# Patient Record
Sex: MALE | Race: WHITE | URBAN - NONMETROPOLITAN AREA
[De-identification: names, ages, dates, MRNs, and addresses within clinical notes are randomized per-mention and may not be internally consistent; named-entity substitution may affect disease eponyms.]

---

## 2023-11-02 PROBLEM — S83.232A COMPLEX TEAR OF MEDIAL MENISCUS OF LEFT KNEE AS CURRENT INJURY: Status: ACTIVE | Noted: 2023-11-02

## 2024-01-20 ENCOUNTER — OFFICE VISIT (OUTPATIENT)
Dept: URGENT CARE | Facility: PHYSICIAN GROUP | Age: 44
End: 2024-01-20
Payer: OTHER GOVERNMENT

## 2024-01-20 VITALS
HEART RATE: 99 BPM | DIASTOLIC BLOOD PRESSURE: 80 MMHG | BODY MASS INDEX: 30.27 KG/M2 | OXYGEN SATURATION: 95 % | TEMPERATURE: 96.8 F | SYSTOLIC BLOOD PRESSURE: 108 MMHG | WEIGHT: 217 LBS | RESPIRATION RATE: 16 BRPM

## 2024-01-20 DIAGNOSIS — U07.1 COVID-19: ICD-10-CM

## 2024-01-20 DIAGNOSIS — R05.1 ACUTE COUGH: ICD-10-CM

## 2024-01-20 LAB
FLUAV RNA SPEC QL NAA+PROBE: NEGATIVE
FLUBV RNA SPEC QL NAA+PROBE: NEGATIVE
RSV RNA SPEC QL NAA+PROBE: NEGATIVE
SARS-COV-2 RNA RESP QL NAA+PROBE: POSITIVE

## 2024-01-20 PROCEDURE — 3079F DIAST BP 80-89 MM HG: CPT | Performed by: NURSE PRACTITIONER

## 2024-01-20 PROCEDURE — 3074F SYST BP LT 130 MM HG: CPT | Performed by: NURSE PRACTITIONER

## 2024-01-20 PROCEDURE — 99204 OFFICE O/P NEW MOD 45 MIN: CPT | Performed by: NURSE PRACTITIONER

## 2024-01-20 PROCEDURE — 0241U POCT CEPHEID COV-2, FLU A/B, RSV - PCR: CPT | Performed by: NURSE PRACTITIONER

## 2024-01-20 RX ORDER — OSELTAMIVIR PHOSPHATE 75 MG/1
75 CAPSULE ORAL 2 TIMES DAILY
Qty: 10 CAPSULE | Refills: 0 | Status: SHIPPED | OUTPATIENT
Start: 2024-01-20 | End: 2024-01-20

## 2024-01-20 RX ORDER — BENZONATATE 100 MG/1
100 CAPSULE ORAL 3 TIMES DAILY PRN
Qty: 60 CAPSULE | Refills: 0 | Status: SHIPPED | OUTPATIENT
Start: 2024-01-20

## 2024-01-20 NOTE — PROGRESS NOTES
Chief Complaint   Patient presents with    Cough     Exposed to flu by wife/last night started to get symptoms. Body aches, dry cough, runny nose.        HISTORY OF PRESENT ILLNESS: Patient is a pleasant 43 y.o. male who presents today due to symptoms which started yesterday with sudden onset. Pt reports a fever, chills, body aches. Reports associated cough, sore throat, nasal congestion, headache, and fatigue. Denies blood in sputum, chest pain, shortness of breath, wheezing, nausea, vomiting, or diarrhea. Denies h/o asthma/copd/CAP. No immunocompromise. Has tried OTC cold/flu medications without significant relief of symptoms. No recent ABX use. No other aggravating or alleviating factors. He did receive a flu vaccination this year.  His wife was ill this past week with influenza A.      Patient Active Problem List    Diagnosis Date Noted    Complex tear of medial meniscus of left knee as current injury 11/02/2023       Allergies:Patient has no known allergies.    Current Outpatient Medications Ordered in Epic   Medication Sig Dispense Refill    oseltamivir (TAMIFLU) 75 MG Cap Take 1 Capsule by mouth 2 times a day. 10 Capsule 0    benzonatate (TESSALON) 100 MG Cap Take 1 Capsule by mouth 3 times a day as needed for Cough. 60 Capsule 0    loratadine (CLARITIN) 10 MG Tab Take 10 mg by mouth every day.      fluticasone (FLONASE) 50 MCG/ACT nasal spray Administer 1 Spray into affected nostril(S) every day.       No current [facility]-ordered facility-administered medications on file.       History reviewed. No pertinent past medical history.    Social History     Tobacco Use    Smoking status: Never    Smokeless tobacco: Never   Vaping Use    Vaping Use: Never used   Substance Use Topics    Alcohol use: Not Currently     Comment: occ    Drug use: Never       No family status information on file.   History reviewed. No pertinent family history.    ROS:  Review of Systems   Constitutional: Positive for subjective fever,  chills, fatigue, malaise. Negative for weight loss.  HENT: Positive for congestion and sore throat. Negative for ear pain, nosebleeds, and neck pain.    Eyes: Negative for vision changes.   Cardiovascular: Negative for chest pain, palpitations, orthopnea and leg swelling.   Respiratory: Positive for cough. Negative for shortness of breath and wheezing.   Gastrointestinal: Negative for abdominal pain, nausea, vomiting or diarrhea.   Skin: Negative for rash, diaphoresis.     Exam:  /80   Pulse 99   Temp 36 °C (96.8 °F) (Temporal)   Resp 16   Wt 98.4 kg (217 lb)   SpO2 95%   General: well-nourished, well-developed male, appears uncomfortable, non-toxic in appearance.   Head: normocephalic, atraumatic.  Eyes: PERRLA, EOM within normal limits, no conjunctival injection, no scleral icterus, visual fields and acuity grossly intact.  Ears: normal shape and symmetry, no tenderness, no discharge. External canals are without any significant edema or erythema. Tympanic membranes are without any inflammation, right-sided effusion. Gross auditory acuity is intact.  Nose: symmetrical without tenderness, mild discharge, erythema present bilateral nares.  Mouth/Throat: reasonable hygiene, no exudates or tonsillar enlargement. Erythema present.   Neck: no masses, range of motion within normal limits, no tracheal deviation.  Lymph: mild cervical adenopathy. No supraclavicular adenopathy.   Neuro: alert and oriented. Cranial nerves 1-12 grossly intact.   Cardiovascular: Regular rate and regular rhythm without murmurs, rubs, or gallops. No edema.   Pulmonary: no distress. Chest is symmetrical with respiration, no wheezes, crackles, or rhonchi.   Musculoskeletal: appropriate muscle tone, gait is stable.  Skin: warm, dry, intact, no clubbing, no cyanosis.   Psych: appropriate mood, affect, judgement.           Assessment/Plan:  1. COVID-19  POCT CoV-2, Flu A/B, RSV by PCR    Nirmatrelvir&Ritonavir 300/100 20 x 150 MG & 10 x  100MG Tablet Therapy Pack    DISCONTINUED: oseltamivir (TAMIFLU) 75 MG Cap      2. Acute cough  benzonatate (TESSALON) 100 MG Cap              Patient positive for COVID, Paxlovid provided.  Tessalon Perles as needed for cough.  Continue Claritin and Flonase for symptom relief and right-sided effusion. Discussed viral etiology, self limiting illness. Did not see any evidence of a bacterial process. Discussed natural progression and sx care. Rest, increase fluid intake, hand and respiratory hygiene. OTC cough/cold medications as directed.   Supportive care, differential diagnoses, and indications for immediate follow-up discussed with patient.   Pathogenesis of diagnosis discussed including typical length and natural progression.   Instructed to return to clinic or nearest emergency department for any change in condition, further concerns, or worsening of symptoms.  Patient states understanding of the plan of care and discharge instructions.  Instructed to make an appointment, for follow up, with his primary care provider.            Please note that this dictation was created using voice recognition software. I have made every reasonable attempt to correct obvious errors, but I expect that there are errors of grammar and possibly content that I did not discover before finalizing the note.       ARABELLA Deal.

## 2024-08-28 ENCOUNTER — APPOINTMENT (RX ONLY)
Dept: URBAN - NONMETROPOLITAN AREA CLINIC 15 | Facility: CLINIC | Age: 44
Setting detail: DERMATOLOGY
End: 2024-08-28

## 2024-08-28 DIAGNOSIS — L72.8 OTHER FOLLICULAR CYSTS OF THE SKIN AND SUBCUTANEOUS TISSUE: ICD-10-CM

## 2024-08-28 DIAGNOSIS — L82.1 OTHER SEBORRHEIC KERATOSIS: ICD-10-CM

## 2024-08-28 PROCEDURE — 99202 OFFICE O/P NEW SF 15 MIN: CPT

## 2024-08-28 PROCEDURE — ? CONSULTATION EXCISION

## 2024-08-28 PROCEDURE — ? COUNSELING

## 2024-08-28 ASSESSMENT — LOCATION DETAILED DESCRIPTION DERM
LOCATION DETAILED: LEFT INFERIOR UPPER BACK
LOCATION DETAILED: PERIUMBILICAL SKIN

## 2024-08-28 ASSESSMENT — LOCATION SIMPLE DESCRIPTION DERM
LOCATION SIMPLE: ABDOMEN
LOCATION SIMPLE: LEFT UPPER BACK

## 2024-08-28 ASSESSMENT — LOCATION ZONE DERM: LOCATION ZONE: TRUNK

## 2024-08-28 NOTE — HPI: SKIN LESION
Is This A New Presentation, Or A Follow-Up?: Skin Lesions
What Type Of Note Output Would You Prefer (Optional)?: Standard Output
How Severe Is Your Skin Lesion?: moderate
decreased strength
Has Your Skin Lesion Been Treated?: not been treated

## 2024-09-17 ENCOUNTER — APPOINTMENT (RX ONLY)
Dept: URBAN - NONMETROPOLITAN AREA CLINIC 15 | Facility: CLINIC | Age: 44
Setting detail: DERMATOLOGY
End: 2024-09-17

## 2024-09-17 DIAGNOSIS — L82.1 OTHER SEBORRHEIC KERATOSIS: ICD-10-CM

## 2024-09-17 DIAGNOSIS — L72.8 OTHER FOLLICULAR CYSTS OF THE SKIN AND SUBCUTANEOUS TISSUE: ICD-10-CM

## 2024-09-17 PROBLEM — D48.5 NEOPLASM OF UNCERTAIN BEHAVIOR OF SKIN: Status: ACTIVE | Noted: 2024-09-17

## 2024-09-17 PROCEDURE — ? BIOPSY BY PUNCH METHOD

## 2024-09-17 PROCEDURE — 11103 TANGNTL BX SKIN EA SEP/ADDL: CPT

## 2024-09-17 PROCEDURE — ? BIOPSY BY SHAVE METHOD

## 2024-09-17 PROCEDURE — 11104 PUNCH BX SKIN SINGLE LESION: CPT

## 2024-09-17 ASSESSMENT — LOCATION DETAILED DESCRIPTION DERM
LOCATION DETAILED: PERIUMBILICAL SKIN
LOCATION DETAILED: LEFT INFERIOR UPPER BACK

## 2024-09-17 ASSESSMENT — LOCATION ZONE DERM: LOCATION ZONE: TRUNK

## 2024-09-17 ASSESSMENT — LOCATION SIMPLE DESCRIPTION DERM
LOCATION SIMPLE: ABDOMEN
LOCATION SIMPLE: LEFT UPPER BACK

## 2024-09-17 NOTE — PROCEDURE: BIOPSY BY PUNCH METHOD
Detail Level: Detailed
Was A Bandage Applied: Yes
Punch Size In Mm: 8
Size Of Lesion In Cm (Optional): 0.7
X Size Of Lesion In Cm (Optional): 0
Depth Of Punch Biopsy: dermis
Biopsy Type: H and E
Anesthesia Type: 1% lidocaine with epinephrine
Anesthesia Volume In Cc: 0.5
Hemostasis: None
Epidermal Sutures: 4-0 Polysorb
Number Of Epidermal Sutures (Optional): 5
Wound Care: Vaseline
Dressing: steri-strips and pressure dressing
Suture Removal: 14 days
Patient Will Remove Sutures At Home?: No
Lab: [id number]
Lab Facility: 
Consent: Written consent was obtained and risks were reviewed including but not limited to scarring, infection, bleeding, scabbing, incomplete removal, nerve damage and allergy to anesthesia.
Post-Care Instructions: I reviewed with the patient in detail post-care instructions. Patient is to keep the biopsy site dry overnight, and then apply bacitracin twice daily until healed. Patient may apply hydrogen peroxide soaks to remove any crusting.
Home Suture Removal Text: Patient was provided a home suture removal kit and will remove their sutures at home.  If they have any questions or difficulties they will call the office.
Notification Instructions: Patient will be notified of biopsy results. However, patient instructed to call the office if not contacted within 2 weeks.
Billing Type: Third-Party Bill
Information: Selecting Yes will display possible errors in your note based on the variables you have selected. This validation is only offered as a suggestion for you. PLEASE NOTE THAT THE VALIDATION TEXT WILL BE REMOVED WHEN YOU FINALIZE YOUR NOTE. IF YOU WANT TO FAX A PRELIMINARY NOTE YOU WILL NEED TO TOGGLE THIS TO 'NO' IF YOU DO NOT WANT IT IN YOUR FAXED NOTE.